# Patient Record
Sex: MALE | Race: BLACK OR AFRICAN AMERICAN | Employment: FULL TIME | URBAN - METROPOLITAN AREA
[De-identification: names, ages, dates, MRNs, and addresses within clinical notes are randomized per-mention and may not be internally consistent; named-entity substitution may affect disease eponyms.]

---

## 2019-06-23 ENCOUNTER — HOSPITAL ENCOUNTER (EMERGENCY)
Age: 29
Discharge: HOME | End: 2019-06-23
Attending: EMERGENCY MEDICINE
Payer: COMMERCIAL

## 2019-06-23 VITALS
TEMPERATURE: 98.1 F | BODY MASS INDEX: 23.94 KG/M2 | HEART RATE: 115 BPM | HEIGHT: 71 IN | DIASTOLIC BLOOD PRESSURE: 72 MMHG | RESPIRATION RATE: 16 BRPM | SYSTOLIC BLOOD PRESSURE: 134 MMHG | OXYGEN SATURATION: 97 % | WEIGHT: 171 LBS

## 2019-06-23 DIAGNOSIS — F39 MOOD DISORDER (HCC): Primary | ICD-10-CM

## 2019-06-23 LAB
AMPHET UR QL SCN: NEGATIVE
ANION GAP SERPL CALC-SCNC: 4 MMOL/L (ref 3–18)
BARBITURATES UR QL SCN: NEGATIVE
BASOPHILS # BLD: 0 K/UL (ref 0–0.1)
BASOPHILS NFR BLD: 0 % (ref 0–2)
BENZODIAZ UR QL: NEGATIVE
BUN SERPL-MCNC: 13 MG/DL (ref 7–18)
BUN/CREAT SERPL: 13 (ref 12–20)
CALCIUM SERPL-MCNC: 9.7 MG/DL (ref 8.5–10.1)
CANNABINOIDS UR QL SCN: POSITIVE
CHLORIDE SERPL-SCNC: 104 MMOL/L (ref 100–108)
CO2 SERPL-SCNC: 29 MMOL/L (ref 21–32)
COCAINE UR QL SCN: NEGATIVE
CREAT SERPL-MCNC: 1.02 MG/DL (ref 0.6–1.3)
DIFFERENTIAL METHOD BLD: ABNORMAL
EOSINOPHIL # BLD: 0 K/UL (ref 0–0.4)
EOSINOPHIL NFR BLD: 0 % (ref 0–5)
ERYTHROCYTE [DISTWIDTH] IN BLOOD BY AUTOMATED COUNT: 13.5 % (ref 11.6–14.5)
ETHANOL SERPL-MCNC: <3 MG/DL (ref 0–3)
GLUCOSE SERPL-MCNC: 95 MG/DL (ref 74–99)
HCT VFR BLD AUTO: 44.5 % (ref 36–48)
HDSCOM,HDSCOM: ABNORMAL
HGB BLD-MCNC: 15.8 G/DL (ref 13–16)
LYMPHOCYTES # BLD: 1.6 K/UL (ref 0.9–3.6)
LYMPHOCYTES NFR BLD: 34 % (ref 21–52)
MCH RBC QN AUTO: 30.7 PG (ref 24–34)
MCHC RBC AUTO-ENTMCNC: 35.5 G/DL (ref 31–37)
MCV RBC AUTO: 86.6 FL (ref 74–97)
METHADONE UR QL: NEGATIVE
MONOCYTES # BLD: 0.6 K/UL (ref 0.05–1.2)
MONOCYTES NFR BLD: 14 % (ref 3–10)
NEUTS SEG # BLD: 2.4 K/UL (ref 1.8–8)
NEUTS SEG NFR BLD: 52 % (ref 40–73)
OPIATES UR QL: NEGATIVE
PCP UR QL: NEGATIVE
PLATELET # BLD AUTO: 237 K/UL (ref 135–420)
PMV BLD AUTO: 11.1 FL (ref 9.2–11.8)
POTASSIUM SERPL-SCNC: 3.7 MMOL/L (ref 3.5–5.5)
RBC # BLD AUTO: 5.14 M/UL (ref 4.7–5.5)
SODIUM SERPL-SCNC: 137 MMOL/L (ref 136–145)
WBC # BLD AUTO: 4.6 K/UL (ref 4.6–13.2)

## 2019-06-23 PROCEDURE — 85025 COMPLETE CBC W/AUTO DIFF WBC: CPT

## 2019-06-23 PROCEDURE — 99284 EMERGENCY DEPT VISIT MOD MDM: CPT

## 2019-06-23 PROCEDURE — 80048 BASIC METABOLIC PNL TOTAL CA: CPT

## 2019-06-23 PROCEDURE — 80307 DRUG TEST PRSMV CHEM ANLYZR: CPT

## 2019-06-23 NOTE — ED TRIAGE NOTES
\"I'm at my breaking point today. \"  He currently endorses SI. Denies a specific plan. He is severely anxious.

## 2019-06-23 NOTE — DISCHARGE INSTRUCTIONS
Patient Education        Learning About Mood Disorders  What are mood disorders? Mood disorders are medical problems that affect how you feel. They can impact your moods, thoughts, and actions. Mood disorders include:  · Depression. This causes you to feel sad or hopeless for much of the time. · Bipolar disorder. This causes extreme mood changes from manic episodes of very high energy to extreme lows of depression. · Seasonal affective disorder (SAD). This is a type of depression that affects you during the same season each year. Most often people experience SAD during the fall and winter months when days are shorter and there is less light. What are the symptoms? Depression  You may:  · Feel sad or hopeless nearly every day. · Lose interest in or not get pleasure from most daily activities. You feel this way nearly every day. · Have low energy, changes in your appetite, or changes in how well you sleep. · Have trouble concentrating. · Think about death and suicide. Keep the numbers for these national suicide hotlines: 6-782-395-TALK (2-399.844.4586) and 8-943-ZXGCXXC (6-103.463.1093). If you or someone you know talks about suicide or feeling hopeless, get help right away. Bipolar disorder  Symptoms depend on your mood swings. You may:  · Feel very happy, energetic, or on edge. · Feel like you need very little sleep. · Feel overly self-confident. · Do impulsive things, such as spending a lot of money. · Feel sad or hopeless. · Have racing thoughts or trouble thinking and making decisions. · Lose interest in things you have enjoyed in the past.  · Think about death and suicide. Keep the numbers for these national suicide hotlines: 5-262-124-TALK (1-946.892.4510) and 6-017-QSKUCDV (6-975.939.3544). If you or someone you know talks about suicide or feeling hopeless, get help right away. Seasonal affective disorder (SAD)  Symptoms come and go at about the same time each year.  For most people with SAD, symptoms come during the winter when there is less daylight. You may:  · Feel sad, grumpy, fry, or anxious. · Lose interest in your usual activities. · Eat more and crave carbohydrates, such as bread and pasta. · Gain weight. · Sleep more and feel drowsy during the daytime. How are mood disorders treated? Mood disorders can be treated with medicines or counseling, or a combination of both. Medicines for depression and SAD may include antidepressants. Medicines for bipolar disorder may include:  · Mood stabilizers. · Antipsychotics. · Benzodiazepines. Counseling may involve cognitive-behavioral therapy. It teaches you how to change the ways you think and behave. This can help you stop thinking bad thoughts about yourself and your life. Light therapy is the main treatment for SAD. This therapy uses a special kind of lamp. You let the lamp shine on you at certain times, usually in the morning. This may help your symptoms during the months when there is less sunlight. Healthy lifestyle  Healthy lifestyle changes may help you feel better. · Get at least 30 minutes of exercise on most days of the week. Walking is a good choice. · Eat a healthy diet. Include fruits, vegetables, lean proteins, and whole grains in your diet each day. · Keep a regular sleep schedule. Try for 8 hours of sleep a night. · Find ways to manage stress, such as relaxation exercises. · Avoid alcohol and illegal drugs. Follow-up care is a key part of your treatment and safety. Be sure to make and go to all appointments, and call your doctor if you are having problems. It's also a good idea to know your test results and keep a list of the medicines you take. Where can you learn more? Go to http://ruby-jarvis.info/. Enter V892 in the search box to learn more about \"Learning About Mood Disorders. \"  Current as of: September 11, 2018  Content Version: 11.9  © 7325-3393 [employer].  Care instructions adapted under license by [employer] (which disclaims liability or warranty for this information). If you have questions about a medical condition or this instruction, always ask your healthcare professional. [address] any warranty or liability for your use of this information.

## 2019-06-23 NOTE — ED PROVIDER NOTES
EMERGENCY DEPARTMENT HISTORY AND PHYSICAL EXAM    11:15 AM      Date: 6/23/2019  Patient Name: Wilder Brooks    History of Presenting Illness     Chief Complaint   Patient presents with    Suicidal    Anxiety         History Provided By: patient    Additional History (Context): Wilder Brooks is a 30 y.o. male presents with apparent mood disorder, says \"I just cannot take it anymore I feel like I am not present\" denies nir anxiety anger or depression. Denies psychosis or drug use. No specific plan to commit suicide but says he is suicidal.. PCP: None    Chief Complaint:   Duration:    Timing:    Location:   Quality:   Severity:   Modifying Factors:   Associated Symptoms:           Past History     Past Medical History:  Past Medical History:   Diagnosis Date    Asthma        Past Surgical History:  History reviewed. No pertinent surgical history. Family History:  History reviewed. No pertinent family history. Social History:  Social History     Tobacco Use    Smoking status: Never Smoker    Smokeless tobacco: Never Used   Substance Use Topics    Alcohol use: Yes     Comment: socially    Drug use: Never       Allergies: Allergies   Allergen Reactions    Pineapple Hoarseness         Review of Systems     Review of Systems   Constitutional: Negative for diaphoresis and fever. HENT: Negative for congestion and sore throat. Eyes: Negative for pain and itching. Respiratory: Negative for cough and shortness of breath. Cardiovascular: Negative for chest pain and palpitations. Gastrointestinal: Negative for abdominal pain and diarrhea. Endocrine: Negative for polydipsia and polyuria. Genitourinary: Negative for dysuria and hematuria. Musculoskeletal: Negative for arthralgias and myalgias. Skin: Negative for rash and wound. Neurological: Negative for seizures and syncope. Hematological: Does not bruise/bleed easily.    Psychiatric/Behavioral: Positive for agitation and dysphoric mood. Negative for hallucinations. Physical Exam       Patient Vitals for the past 12 hrs:   Temp Pulse Resp BP SpO2   06/23/19 1142 98.1 °F (36.7 °C)  16     06/23/19 1141     97 %   06/23/19 1140    134/72    06/23/19 1045 98 °F (36.7 °C) (!) 115 30 (!) 147/99 100 %       Physical Exam   Constitutional: He appears well-developed and well-nourished. HENT:   Head: Normocephalic and atraumatic. Eyes: Conjunctivae are normal. No scleral icterus. Neck: Normal range of motion. Neck supple. No JVD present. Cardiovascular: Normal rate, regular rhythm and intact distal pulses. Pulmonary/Chest: Effort normal. No respiratory distress. Musculoskeletal: Normal range of motion. Neurological: He is alert. Skin: Skin is warm and dry. Psychiatric: Judgment and thought content normal.   Affect is activated, agitated but directable and following directions, no behavior problems at this point. Denies delusions thought process is linear his answers are coherent. Mood is \"disconnected. \".     Nursing note and vitals reviewed.         Diagnostic Study Results   Labs -  Recent Results (from the past 12 hour(s))   DRUG SCREEN, URINE    Collection Time: 06/23/19 10:42 AM   Result Value Ref Range    BENZODIAZEPINES NEGATIVE  NEG      BARBITURATES NEGATIVE  NEG      THC (TH-CANNABINOL) POSITIVE (A) NEG      OPIATES NEGATIVE  NEG      PCP(PHENCYCLIDINE) NEGATIVE  NEG      COCAINE NEGATIVE  NEG      AMPHETAMINES NEGATIVE  NEG      METHADONE NEGATIVE  NEG      HDSCOM (NOTE)    ETHYL ALCOHOL    Collection Time: 06/23/19 11:16 AM   Result Value Ref Range    ALCOHOL(ETHYL),SERUM <3 0 - 3 MG/DL   CBC WITH AUTOMATED DIFF    Collection Time: 06/23/19 11:16 AM   Result Value Ref Range    WBC 4.6 4.6 - 13.2 K/uL    RBC 5.14 4.70 - 5.50 M/uL    HGB 15.8 13.0 - 16.0 g/dL    HCT 44.5 36.0 - 48.0 %    MCV 86.6 74.0 - 97.0 FL    MCH 30.7 24.0 - 34.0 PG    MCHC 35.5 31.0 - 37.0 g/dL    RDW 13.5 11.6 - 14.5 %    PLATELET 237 135 - 420 K/uL    MPV 11.1 9.2 - 11.8 FL    NEUTROPHILS 52 40 - 73 %    LYMPHOCYTES 34 21 - 52 %    MONOCYTES 14 (H) 3 - 10 %    EOSINOPHILS 0 0 - 5 %    BASOPHILS 0 0 - 2 %    ABS. NEUTROPHILS 2.4 1.8 - 8.0 K/UL    ABS. LYMPHOCYTES 1.6 0.9 - 3.6 K/UL    ABS. MONOCYTES 0.6 0.05 - 1.2 K/UL    ABS. EOSINOPHILS 0.0 0.0 - 0.4 K/UL    ABS. BASOPHILS 0.0 0.0 - 0.1 K/UL    DF AUTOMATED     METABOLIC PANEL, BASIC    Collection Time: 06/23/19 11:16 AM   Result Value Ref Range    Sodium 137 136 - 145 mmol/L    Potassium 3.7 3.5 - 5.5 mmol/L    Chloride 104 100 - 108 mmol/L    CO2 29 21 - 32 mmol/L    Anion gap 4 3.0 - 18 mmol/L    Glucose 95 74 - 99 mg/dL    BUN 13 7.0 - 18 MG/DL    Creatinine 1.02 0.6 - 1.3 MG/DL    BUN/Creatinine ratio 13 12 - 20      GFR est AA >60 >60 ml/min/1.73m2    GFR est non-AA >60 >60 ml/min/1.73m2    Calcium 9.7 8.5 - 10.1 MG/DL       Radiologic Studies -   No orders to display     No results found. Medications ordered:   Medications - No data to display      Medical Decision Making   Initial Medical Decision Making and DDx: We will get clearance labs and have crisis speak to him. Doubt encephalopathy or organic cause for his symptoms. ED Course: Progress Notes, Reevaluation, and Consults:  ED Course as of Jun 23 1453   Sun Jun 23, 2019   1259 Drug screen positive for [facility] otherwise nonactionable labs: Crisis. [CB]   7844 Discussed with Clint Up from crisis will see and evaluate the patient. [CB]      ED Course User Index  [CB] Glenroy Fields MD     2:53 PM patient was seen and evaluated by Clint Up from crisis. Has adequate follow-up, many positive factors including seeking help through multiple avenues today. He will be discharged. I am the first provider for this patient. I reviewed the vital signs, available nursing notes, past medical history, past surgical history, family history and social history.     Patient Vitals for the past 12 hrs:   Temp Pulse Resp BP SpO2 06/23/19 1142 98.1 °F (36.7 °C)  16     06/23/19 1141     97 %   06/23/19 1140    134/72    06/23/19 1045 98 °F (36.7 °C) (!) 115 30 (!) 147/99 100 %       Vital Signs-Reviewed the patient's vital signs. Pulse Oximetry Analysis, Cardiac Monitor, 12 lead ekg:      Interpreted by the EP. Records Reviewed: Nursing notes reviewed (Time of Review: 11:15 AM)    Procedures:   Critical Care Time:   Aspirin: (was aspirin given for stroke?)    Diagnosis     Clinical Impression:   1.  Mood disorder [facility])        Disposition: Discharged      Follow-up Information     Follow up With Specialties Details Why [address] [place] [address]  [phone]           Patient's Medications    No medications on file     _______________________________    Notes:    Tish Emanuel MD using Dragon dictation      _______________________________

## 2019-06-23 NOTE — ED NOTES
Patient stated he is currently suicidal but has no plans to carry out. He states \"I just couldn't take it anymore. I have a lot going on. I have been holding everything in since I was 11 when I lost my grandpa. \" Patient denies HI.

## 2019-06-23 NOTE — ED NOTES
Patient belongings collected in bag and placed in [place] [address] with patient label. Belongings include a phone, keys, wallet, shoes, and clothing.

## 2019-06-24 NOTE — BSMART NOTE
31 yo M seen in ED room 13 at the request of . Client had locked himself in the bathroom & called a suicide hotline,  and \[facility]\""[facility] emergency services, PPD sent for well-person check & transported voluntarily to ED. Cousin and sitter at bedside, interviewed alone. Alert & O x 4, tearful at times, cooperative, memory & judgement intact, insight fair. Lives with his boyfriend of 7 years. 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 CC: Depression and suicidal ideation \"It's all just too much. \" Client states he becomes very overwhelmed and anxious. Feels that he doesn't fit in, feels paranoid, worried about what others think about him. Client was home alone as his boyfriend of 7 years had gone to work when above event occurred. Admits to some relationship stress. States he feels an exaggerated need to be liked and to not make anyone angry. States he has been at some level of sadness since his Pop-Pop passed away 17-18 years ago when client was 15-16 years old. \"We were very close. \" 
Denies suicidal or homicidal ideation. Denies previous suicide attempt or plan. Denies A / V hallucinations. Admits to using marijuana and only rare alcohol use. UDSC+THC only, BAL=negative. States that he feels much better talking it out. Contracts for safety from self-harm. Cousin included in discharge plan at client's request. She has offered support and encouragement and will provide accountability for client to follow-up with outpatient mental health referral. Provided list of resources. DISPOSITION: Discussed with . Client discharged to home with cousin per ED to follow-up with outpatient counseling.

## 2020-12-30 ENCOUNTER — TRANSCRIBE ORDER (OUTPATIENT)
Dept: REGISTRATION | Age: 30
End: 2020-12-30

## 2020-12-30 ENCOUNTER — HOSPITAL ENCOUNTER (OUTPATIENT)
Dept: GENERAL RADIOLOGY | Age: 30
Discharge: HOME | End: 2020-12-30
Admitting: STUDENT IN AN ORGANIZED HEALTH CARE EDUCATION/TRAINING PROGRAM
Payer: COMMERCIAL

## 2020-12-30 DIAGNOSIS — Z87.09 PERSONAL HISTORY OF UNSPECIFIED DISEASE OF RESPIRATORY SYSTEM: Primary | ICD-10-CM

## 2020-12-30 DIAGNOSIS — Z87.09 PERSONAL HISTORY OF UNSPECIFIED DISEASE OF RESPIRATORY SYSTEM: ICD-10-CM

## 2020-12-30 PROCEDURE — 71046 X-RAY EXAM CHEST 2 VIEWS: CPT

## 2021-06-29 ENCOUNTER — TRANSCRIBE ORDER (OUTPATIENT)
Dept: SCHEDULING | Age: 31
End: 2021-06-29

## 2021-06-29 DIAGNOSIS — J01.90 ACUTE SINUSITIS, UNSPECIFIED: Primary | ICD-10-CM
